# Patient Record
Sex: MALE | Race: BLACK OR AFRICAN AMERICAN | URBAN - METROPOLITAN AREA
[De-identification: names, ages, dates, MRNs, and addresses within clinical notes are randomized per-mention and may not be internally consistent; named-entity substitution may affect disease eponyms.]

---

## 2020-07-20 ENCOUNTER — OFFICE VISIT (OUTPATIENT)
Dept: URBAN - METROPOLITAN AREA CLINIC 92 | Facility: CLINIC | Age: 45
End: 2020-07-20

## 2021-01-15 ENCOUNTER — OFFICE VISIT (OUTPATIENT)
Dept: URBAN - METROPOLITAN AREA CLINIC 92 | Facility: CLINIC | Age: 46
End: 2021-01-15
Payer: COMMERCIAL

## 2021-01-15 ENCOUNTER — OFFICE VISIT (OUTPATIENT)
Dept: URBAN - METROPOLITAN AREA CLINIC 92 | Facility: CLINIC | Age: 46
End: 2021-01-15

## 2021-01-15 VITALS
HEIGHT: 69 IN | WEIGHT: 196 LBS | SYSTOLIC BLOOD PRESSURE: 128 MMHG | DIASTOLIC BLOOD PRESSURE: 92 MMHG | HEART RATE: 64 BPM | BODY MASS INDEX: 29.03 KG/M2 | TEMPERATURE: 96 F

## 2021-01-15 DIAGNOSIS — K51.50 ULCERATIVE COLITIS, LEFT SIDED: ICD-10-CM

## 2021-01-15 PROCEDURE — 4004F PT TOBACCO SCREEN RCVD TLK: CPT | Performed by: INTERNAL MEDICINE

## 2021-01-15 PROCEDURE — G8417 CALC BMI ABV UP PARAM F/U: HCPCS | Performed by: INTERNAL MEDICINE

## 2021-01-15 PROCEDURE — 99214 OFFICE O/P EST MOD 30 MIN: CPT | Performed by: INTERNAL MEDICINE

## 2021-01-15 PROCEDURE — G8427 DOCREV CUR MEDS BY ELIG CLIN: HCPCS | Performed by: INTERNAL MEDICINE

## 2021-01-15 PROCEDURE — G8482 FLU IMMUNIZE ORDER/ADMIN: HCPCS | Performed by: INTERNAL MEDICINE

## 2021-01-15 NOTE — HPI-OTHER HISTORIES
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 He had responds well to  5-ASA enemas  Also noted to be Vit d deficient. Patient has been known to use his rowasa enemas sparingly despite being counseled on the importance of daily use. He was seen in 2017 with flex sig showing mild active colitis by histology in recto-sigmoid.   Since last visit, he has been off Rowasa enemas since March 2020 due to concerns with COVID, although he admits data does not support increased risk. Mild dull LLQ discomfort. No diarrhea, constipation, wt loss ro GI bleeding.

## 2021-01-16 LAB
A/G RATIO: 1.5
ALBUMIN: 4.5
ALKALINE PHOSPHATASE: 70
ALT (SGPT): 24
AST (SGOT): 30
BILIRUBIN, TOTAL: 0.5
BUN/CREATININE RATIO: 6
BUN: 8
CALCIUM: 9.7
CARBON DIOXIDE, TOTAL: 24
CHLORIDE: 103
CREATININE: 1.25
EGFR IF AFRICN AM: 79
EGFR IF NONAFRICN AM: 69
GLOBULIN, TOTAL: 3
GLUCOSE: 90
HEMATOCRIT: 39.8
HEMOGLOBIN: 13.3
MCH: 28.5
MCHC: 33.4
MCV: 85
NRBC: (no result)
PLATELETS: 252
POTASSIUM: 4.8
PROTEIN, TOTAL: 7.5
RBC: 4.66
RDW: 13.2
SODIUM: 139
VITAMIN D, 25-HYDROXY: 24.9
WBC: 4.3

## 2021-04-07 ENCOUNTER — OFFICE VISIT (OUTPATIENT)
Dept: URBAN - METROPOLITAN AREA SURGERY CENTER 16 | Facility: SURGERY CENTER | Age: 46
End: 2021-04-07
Payer: COMMERCIAL

## 2021-04-07 DIAGNOSIS — K51.50 CHRONIC LEFT-SIDED ULCERATIVE COLITIS: ICD-10-CM

## 2021-04-07 PROCEDURE — G8907 PT DOC NO EVENTS ON DISCHARG: HCPCS | Performed by: INTERNAL MEDICINE

## 2021-04-07 PROCEDURE — 45378 DIAGNOSTIC COLONOSCOPY: CPT | Performed by: INTERNAL MEDICINE

## 2022-10-28 ENCOUNTER — OFFICE VISIT (OUTPATIENT)
Dept: URBAN - METROPOLITAN AREA TELEHEALTH 2 | Facility: TELEHEALTH | Age: 47
End: 2022-10-28
Payer: COMMERCIAL

## 2022-10-28 VITALS — BODY MASS INDEX: 29.62 KG/M2 | WEIGHT: 200 LBS | HEIGHT: 69 IN

## 2022-10-28 DIAGNOSIS — E55.9 VITAMIN D DEFICIENCY: ICD-10-CM

## 2022-10-28 DIAGNOSIS — K51.50 ULCERATIVE COLITIS, LEFT SIDED: ICD-10-CM

## 2022-10-28 PROCEDURE — 99214 OFFICE O/P EST MOD 30 MIN: CPT | Performed by: PHYSICIAN ASSISTANT

## 2022-10-28 PROCEDURE — 99214 OFFICE O/P EST MOD 30 MIN: CPT | Performed by: INTERNAL MEDICINE

## 2022-10-28 RX ORDER — MESALAMINE 1000 MG/1
1 SUPPOSITORY AT BEDTIME SUPPOSITORY RECTAL ONCE A DAY
Qty: 30 | OUTPATIENT
Start: 2022-10-28 | End: 2022-11-26

## 2022-10-28 RX ORDER — MESALAMINE 1.2 G/1
2 TABLETS WITH A MEAL TABLET, DELAYED RELEASE ORAL ONCE A DAY
Qty: 60 | OUTPATIENT
Start: 2022-10-28 | End: 2022-11-26

## 2022-10-28 RX ORDER — ASPIRIN 81 MG/1
1 TABLET TABLET, CHEWABLE ORAL ONCE A DAY
Status: ACTIVE | COMMUNITY

## 2022-10-28 RX ORDER — MESALAMINE 4 G/60ML
AS DIRECTED SUSPENSION RECTAL
Qty: 30 | Refills: 6 | OUTPATIENT
Start: 2022-10-28 | End: 2023-05-26

## 2022-10-28 NOTE — HPI-OTHER HISTORIES
47yoM dx with proctosigmoiditis in 2015. He had responds well to  5-ASA enemas but uses them prn/sparingly despite being counseled on the importance of daily use. 2017 flex sig showing mild active colitis by histology in recto-sigmoid. Had a flare last year, responded to enemas. Colon 4/7/2021 [facility] ow normal, no bx. Off meds for a year and now notes 1m of BMs 5-6 times/day with assoc hematochezia. Denies nocturnal stools but has urgency and tenesmus. Some L-sided abd discomfort X 2 weeks, no triggers. No N/V, fevers or chills.  No Family hx of IBD or colon CA  In between PCPs so no recent labs. Hx vit D def, on OTC daily CAD s/p stent 7/202 Declines getting flu or covid vaccine - had covid

## 2022-11-29 LAB
A/G RATIO: 1.5
ALBUMIN: 4.3
ALKALINE PHOSPHATASE: 62
ALT (SGPT): 24
AST (SGOT): 24
BILIRUBIN, TOTAL: 0.7
BUN/CREATININE RATIO: (no result)
BUN: 12
C-REACTIVE PROTEIN, QUANT: 0.6
CALCIUM: 9.5
CARBON DIOXIDE, TOTAL: 29
CHLORIDE: 104
CREATININE: 1.16
EGFR: 78
GLOBULIN, TOTAL: 2.9
GLUCOSE: 112
HEMATOCRIT: 41.4
HEMOGLOBIN: 13.5
MCH: 27.4
MCHC: 32.6
MCV: 84.1
MPV: 11.7
PLATELET COUNT: 222
POTASSIUM: 4.6
PROTEIN, TOTAL: 7.2
RDW: 12.9
RED BLOOD CELL COUNT: 4.92
SODIUM: 141
VITAMIN D,25-OH,TOTAL,IA: 40
WHITE BLOOD CELL COUNT: 5.2

## 2022-12-07 LAB — CALPROTECTIN, FECAL: 59

## 2022-12-13 ENCOUNTER — TELEPHONE ENCOUNTER (OUTPATIENT)
Dept: URBAN - METROPOLITAN AREA CLINIC 92 | Facility: CLINIC | Age: 47
End: 2022-12-13

## 2022-12-13 RX ORDER — MESALAMINE 1.2 G/1
2 TABLETS WITH A MEAL TABLET, DELAYED RELEASE ORAL ONCE A DAY
Qty: 60
Start: 2022-10-28 | End: 2023-01-12

## 2022-12-20 ENCOUNTER — CLAIMS CREATED FROM THE CLAIM WINDOW (OUTPATIENT)
Dept: URBAN - METROPOLITAN AREA TELEHEALTH 2 | Facility: TELEHEALTH | Age: 47
End: 2022-12-20
Payer: COMMERCIAL

## 2022-12-20 ENCOUNTER — TELEPHONE ENCOUNTER (OUTPATIENT)
Dept: URBAN - METROPOLITAN AREA CLINIC 5 | Facility: CLINIC | Age: 47
End: 2022-12-20

## 2022-12-20 VITALS — BODY MASS INDEX: 30.07 KG/M2 | WEIGHT: 203 LBS | HEIGHT: 69 IN

## 2022-12-20 DIAGNOSIS — E55.9 VITAMIN D DEFICIENCY: ICD-10-CM

## 2022-12-20 DIAGNOSIS — K51.50 ULCERATIVE COLITIS, LEFT SIDED: ICD-10-CM

## 2022-12-20 PROBLEM — 445243001 LEFT SIDED ULCERATIVE COLITIS: Status: ACTIVE | Noted: 2021-01-15

## 2022-12-20 PROCEDURE — 99214 OFFICE O/P EST MOD 30 MIN: CPT | Performed by: PHYSICIAN ASSISTANT

## 2022-12-20 PROCEDURE — 99214 OFFICE O/P EST MOD 30 MIN: CPT | Performed by: INTERNAL MEDICINE

## 2022-12-20 RX ORDER — MESALAMINE 4 G/60ML
AS DIRECTED SUSPENSION RECTAL
Qty: 30 | Refills: 6 | Status: ON HOLD | COMMUNITY
Start: 2022-10-28 | End: 2023-05-26

## 2022-12-20 RX ORDER — MESALAMINE 4 G/60ML
AS DIRECTED SUSPENSION RECTAL
Qty: 30 | Refills: 6 | OUTPATIENT

## 2022-12-20 RX ORDER — ASPIRIN 81 MG/1
1 TABLET TABLET, CHEWABLE ORAL ONCE A DAY
Status: ACTIVE | COMMUNITY

## 2022-12-20 RX ORDER — MESALAMINE 1.2 G/1
2 TABLETS WITH A MEAL TABLET, DELAYED RELEASE ORAL ONCE A DAY
Qty: 60 | Status: ACTIVE | COMMUNITY
Start: 2022-10-28 | End: 2023-01-12

## 2022-12-20 RX ORDER — MESALAMINE 1.2 G/1
2 TABLETS WITH A MEAL TABLET, DELAYED RELEASE ORAL ONCE A DAY
Qty: 180 TABLET | Refills: 1
Start: 2022-10-28 | End: 2023-06-18

## 2022-12-20 NOTE — HPI-OTHER HISTORIES
47yoM dx with proctosigmoiditis in 2015. He had responded well to 5-ASA enemas but always was using prn/sparingly despite being counseled on the importance of daily use. 2017 flex sig showing mild active colitis by histology in recto-sigmoid. Had a flare last year, responded to enemas. Colon 4/7/2021 I[facility] ow normal, no bx. He was off meds for a year plus and seen in Oct w/ 1m of BMs 5-6 times/day with assoc hematochezia. Assoc urgency and tenesmus and L-sided abd discomfort.  Started oral and topical mesalamine and labs/stool below (1m into meds). Has been taking the mesalamine 2.4g/day but only about 3-4 uses of topical enema 2' travel and schedule. BMs down to 1-2/day, formed and no more hematochezia. Urgency improved as has L-sided discomfort. Weight stable  No Family hx of IBD or colon CA  CAD s/p stent 7/202 Declines getting flu or covid vaccine - had covid

## 2023-07-11 ENCOUNTER — TELEPHONE ENCOUNTER (OUTPATIENT)
Dept: URBAN - METROPOLITAN AREA CLINIC 19 | Facility: CLINIC | Age: 48
End: 2023-07-11

## 2023-07-11 RX ORDER — MESALAMINE 1.2 G/1
2 TABLETS WITH A MEAL TABLET, DELAYED RELEASE ORAL ONCE A DAY
Qty: 180 TABLET | Refills: 1
Start: 2022-10-28 | End: 2024-01-08

## 2023-07-12 ENCOUNTER — TELEPHONE ENCOUNTER (OUTPATIENT)
Dept: URBAN - METROPOLITAN AREA CLINIC 98 | Facility: CLINIC | Age: 48
End: 2023-07-12

## 2023-07-25 ENCOUNTER — DASHBOARD ENCOUNTERS (OUTPATIENT)
Age: 48
End: 2023-07-25

## 2023-08-01 ENCOUNTER — TELEPHONE ENCOUNTER (OUTPATIENT)
Dept: URBAN - METROPOLITAN AREA CLINIC 92 | Facility: CLINIC | Age: 48
End: 2023-08-01

## 2023-08-01 ENCOUNTER — LAB OUTSIDE AN ENCOUNTER (OUTPATIENT)
Dept: URBAN - METROPOLITAN AREA TELEHEALTH 2 | Facility: TELEHEALTH | Age: 48
End: 2023-08-01

## 2023-08-01 ENCOUNTER — OFFICE VISIT (OUTPATIENT)
Dept: URBAN - METROPOLITAN AREA TELEHEALTH 2 | Facility: TELEHEALTH | Age: 48
End: 2023-08-01
Payer: COMMERCIAL

## 2023-08-01 VITALS — WEIGHT: 203 LBS | BODY MASS INDEX: 30.07 KG/M2 | HEIGHT: 69 IN

## 2023-08-01 DIAGNOSIS — K51.50 ULCERATIVE COLITIS, LEFT SIDED: ICD-10-CM

## 2023-08-01 DIAGNOSIS — E55.9 VITAMIN D DEFICIENCY: ICD-10-CM

## 2023-08-01 PROCEDURE — 99213 OFFICE O/P EST LOW 20 MIN: CPT | Performed by: INTERNAL MEDICINE

## 2023-08-01 RX ORDER — MESALAMINE 4 G/60ML
AS DIRECTED SUSPENSION RECTAL
Qty: 30 | Refills: 6 | Status: ACTIVE | COMMUNITY

## 2023-08-01 RX ORDER — MESALAMINE 1.2 G/1
2 TABLETS WITH A MEAL TABLET, DELAYED RELEASE ORAL ONCE A DAY
Qty: 180 TABLET | Refills: 1 | Status: ACTIVE | COMMUNITY
Start: 2022-10-28 | End: 2024-01-08

## 2023-08-01 RX ORDER — ASPIRIN 81 MG/1
1 TABLET TABLET, CHEWABLE ORAL ONCE A DAY
Status: ACTIVE | COMMUNITY

## 2023-08-01 RX ORDER — MESALAMINE 4 G/60ML
AS DIRECTED SUSPENSION RECTAL
Qty: 30 | Refills: 6 | OUTPATIENT

## 2023-08-01 RX ORDER — MESALAMINE 1.2 G/1
2 TABLETS WITH A MEAL TABLET, DELAYED RELEASE ORAL ONCE A DAY
Qty: 180 TABLET | Refills: 3 | OUTPATIENT
Start: 2023-08-01 | End: 2024-07-26

## 2023-08-01 NOTE — HPI-OTHER HISTORIES
48yoM dx with proctosigmoiditis in 2015. He had responded well to 5-ASA enemas but always was using prn/sparingly despite being counseled on the importance of daily use. 2017 flex sig showing mild active colitis by histology in recto-sigmoid.  Colon 4/7/2021 [facility] ow normal, no bx.  Was off meds and flared in Oct 2022-Started oral and topical mesalamine and labs/stool below (1m into meds). Rec daily po and topical 2/week. While on this regimen was doing well but ran out of the enemas a few months ago and c/w the pills which ran out about a month ago. Denies diarrhea, urgency, hematochezia or abd pain. Topical was hard to maintain.  No Family hx of IBD or colon CA  CAD s/p stent in 2021. Reports neg cath recently  Declines getting flu or covid vaccine - had covid  PCP did labs a few months ago--Dr. Jean Zavala. Dx with DM but working on diet and exercise.

## 2023-08-11 ENCOUNTER — TELEPHONE ENCOUNTER (OUTPATIENT)
Dept: URBAN - METROPOLITAN AREA CLINIC 92 | Facility: CLINIC | Age: 48
End: 2023-08-11

## 2023-08-11 RX ORDER — MESALAMINE 4 G/60ML
AS DIRECTED SUSPENSION RECTAL
Qty: 3 KIT | Refills: 1

## 2024-08-20 ENCOUNTER — OFFICE VISIT (OUTPATIENT)
Dept: URBAN - METROPOLITAN AREA CLINIC 92 | Facility: CLINIC | Age: 49
End: 2024-08-20
Payer: COMMERCIAL

## 2024-08-20 VITALS
SYSTOLIC BLOOD PRESSURE: 124 MMHG | HEIGHT: 69 IN | WEIGHT: 182.6 LBS | DIASTOLIC BLOOD PRESSURE: 77 MMHG | HEART RATE: 56 BPM | TEMPERATURE: 96.8 F | BODY MASS INDEX: 27.05 KG/M2

## 2024-08-20 DIAGNOSIS — K51.80 OTHER ULCERATIVE COLITIS WITHOUT COMPLICATIONS: ICD-10-CM

## 2024-08-20 DIAGNOSIS — E56.8 DEFICIENCY OF OTHER VITAMINS: ICD-10-CM

## 2024-08-20 PROCEDURE — 99213 OFFICE O/P EST LOW 20 MIN: CPT

## 2024-08-20 RX ORDER — MESALAMINE 1.2 G/1
2 TABLETS WITH A MEAL TABLET, DELAYED RELEASE ORAL ONCE A DAY
Qty: 180 | Refills: 0
Start: 2023-08-01 | End: 2024-11-18

## 2024-08-20 RX ORDER — MESALAMINE 4 G/60ML
AS DIRECTED SUSPENSION RECTAL
Qty: 3 KIT | Refills: 1 | Status: ON HOLD | COMMUNITY

## 2024-08-20 RX ORDER — ASPIRIN 81 MG/1
1 TABLET TABLET, CHEWABLE ORAL ONCE A DAY
Status: ACTIVE | COMMUNITY

## 2024-08-20 NOTE — HPI-TODAY'S VISIT:
49yoM dx with proctosigmoiditis in 2015. Last saw Monse 8/2023. He had responded well to 5-ASA enemas but always was using prn/sparingly despite being counseled on the importance of daily use. 2017 flex sig showing mild active colitis by histology in recto-sigmoid. Colon 4/7/2021 I[facility] ow normal, no bx. Was off meds and flared in Oct 2022-Started oral and topical mesalamine and labs/stool below (1m into meds). Rec daily po and topical 2/week. He was given mesalamine 2 tabs daily which he has been doing for a year. He has run out of meds at times and used rowasa during these times.Denies diarrhea, urgency, hematochezia or abd pain. Topical was hard to maintain. No Family hx of IBD or colon CA CAD s/p stent in 2021. On prasugrel and ASA. Reports neg cath recently. Follows with Dr. Chad Mckenna. CC was sent last time but no response. Declines getting flu or covid vaccine - had covid PCP did labs a few months ago--Dr. Jean Zavala. Dx with DM and was placed on Ozempic earlier this year. This gave him a rash so d/c. He was then placed on Mounjaro and lost around 30 pounds with this. He is now d/c this medication and has been off x 2 weeks.

## 2024-08-20 NOTE — PHYSICAL EXAM GASTROINTESTINAL
Abdomen,  soft, nontender, nondistended,  no guarding or rigidity,  no masses palpable,  normal bowel sounds Liver and Spleen,no hepatosplenomegaly PT'S HOME MEDICATIONS QLAIRA(13PILLS) AND ELIFORE (14PILLS), DELIVERED TO PHARMACY. DR CISNEROS AND ALICIA, CHARGE NURSE MADE AWARE. WILL CONTINUE WITH PLAN OF CARE

## 2024-08-30 ENCOUNTER — TELEPHONE ENCOUNTER (OUTPATIENT)
Dept: URBAN - METROPOLITAN AREA CLINIC 92 | Facility: CLINIC | Age: 49
End: 2024-08-30

## 2024-10-18 ENCOUNTER — TELEPHONE ENCOUNTER (OUTPATIENT)
Dept: URBAN - METROPOLITAN AREA CLINIC 5 | Facility: CLINIC | Age: 49
End: 2024-10-18

## 2024-12-17 ENCOUNTER — TELEPHONE ENCOUNTER (OUTPATIENT)
Dept: URBAN - METROPOLITAN AREA CLINIC 92 | Facility: CLINIC | Age: 49
End: 2024-12-17

## 2024-12-27 ENCOUNTER — OFFICE VISIT (OUTPATIENT)
Dept: URBAN - METROPOLITAN AREA SURGERY CENTER 16 | Facility: SURGERY CENTER | Age: 49
End: 2024-12-27